# Patient Record
Sex: MALE | Race: WHITE | NOT HISPANIC OR LATINO | Employment: OTHER | URBAN - METROPOLITAN AREA
[De-identification: names, ages, dates, MRNs, and addresses within clinical notes are randomized per-mention and may not be internally consistent; named-entity substitution may affect disease eponyms.]

---

## 2023-10-20 ENCOUNTER — HOSPITAL ENCOUNTER (EMERGENCY)
Facility: HOSPITAL | Age: 38
Discharge: HOME | End: 2023-10-20
Attending: EMERGENCY MEDICINE
Payer: OTHER GOVERNMENT

## 2023-10-20 VITALS
HEART RATE: 98 BPM | OXYGEN SATURATION: 100 % | RESPIRATION RATE: 18 BRPM | TEMPERATURE: 98 F | SYSTOLIC BLOOD PRESSURE: 126 MMHG | WEIGHT: 220 LBS | DIASTOLIC BLOOD PRESSURE: 95 MMHG | HEIGHT: 73 IN | BODY MASS INDEX: 29.16 KG/M2

## 2023-10-20 DIAGNOSIS — M79.652 LEFT THIGH PAIN: Primary | ICD-10-CM

## 2023-10-20 DIAGNOSIS — R03.0 ELEVATED BLOOD PRESSURE READING: ICD-10-CM

## 2023-10-20 PROCEDURE — 99283 EMERGENCY DEPT VISIT LOW MDM: CPT

## 2023-10-20 RX ORDER — METHOCARBAMOL 500 MG/1
1000 TABLET, FILM COATED ORAL 3 TIMES DAILY PRN
Qty: 20 TABLET | Refills: 0 | Status: SHIPPED | OUTPATIENT
Start: 2023-10-20 | End: 2023-10-25

## 2023-10-20 NOTE — DISCHARGE INSTRUCTIONS
Get some good rest.  Ice to the area to help with any stiffness and soreness.  Continue with ibuprofen or Tylenol as needed for pain.  Robaxin for continued stiffness/soreness. Be aware, this medication is sedating.  Do not mix with alcohol or any other sedating medications.  Do not drive or operate machinery when taking this medication.  Avoid movements which worsen your discomfort, especially for the next 2 weeks.    Contact your primary care provider for follow-up and re-evaluation.  Follow-up with a local orthopedic physician for re-evaluation of any persistent or worsening symptoms.    Return to this ED if you develop leg swelling, if unable to walk or bear weight, if unable to tolerate pain, if any other problems occur.

## 2023-10-20 NOTE — ED PROVIDER NOTES
Encounter Date: 10/20/2023       History     Chief Complaint   Patient presents with    Leg Injury     Left leg pain after hearing a pop while playing flag football, ambulatory but with increasing pain     38-year-old male presents to ED with chief complaint left posterior thigh pain after injury earlier today.    Patient was playing flag football; he was running a route, attempted to burst into spread, felt a pop to his left leg posterior thigh region.  Denies any significant pain initially.  States over the past few hours has experience worsening pain, stiffness to the area, associated tenderness.  No trauma to the leg.  There is some discomfort with certain hip range of motion.  There is discomfort with full extension of his knee.  Associated mild antalgic gait secondary to discomfort.  No relief with Motrin.  There is no joint swelling.  No open wound.  Denies any obvious ecchymosis or swelling to the area.  Area feels tight, sharp pain with certain movements.  No previous injury or surgery to the area.  No unilateral leg swelling.  No history of VTE.  No calf pain.  No joint pain.  Denies associated back pain.  No leg weakness.  Denies any radiation of pain.  No radicular complaints.  No anesthesia.    Denies significant past medical history      Review of patient's allergies indicates:   Allergen Reactions    Peanut (legumes)      History reviewed. No pertinent past medical history.  History reviewed. No pertinent surgical history.  History reviewed. No pertinent family history.  Social History     Tobacco Use    Smoking status: Never    Smokeless tobacco: Never   Substance Use Topics    Alcohol use: Yes     Comment: occ    Drug use: Never     Review of Systems   Constitutional:  Negative for fever.   Musculoskeletal:  Positive for gait problem. Negative for arthralgias, back pain and joint swelling.   Skin:  Negative for rash and wound.   Neurological:  Negative for syncope and weakness.       Physical Exam      Initial Vitals [10/20/23 0138]   BP Pulse Resp Temp SpO2   (!) 153/105 99 18 97.8 °F (36.6 °C) 100 %      MAP       --         Physical Exam    Nursing note and vitals reviewed.  Constitutional: He appears well-developed and well-nourished. He is not diaphoretic. No distress.   HENT:   Head: Normocephalic and atraumatic.   Neck: Neck supple.   Normal range of motion.  Cardiovascular:  Intact distal pulses.           2+ DP bilaterally   Musculoskeletal:      Cervical back: Normal range of motion and neck supple.      Comments: No tenderness to the pelvis or greater trochanter of his left hip.  Retains full active range of motion of his hip, knee with some discomfort during full knee extension, hip flexion.  There is pain to the posterior thigh with straight leg testing, no reported back pain.  There is tenderness to the midshaft femur posterior thigh soft tissues.  No overlying skin changes to the area.  No popliteal mass or palpable cord, no popliteal tenderness.  No joint swelling.     Neurological: He is alert and oriented to person, place, and time.   Skin: Skin is warm. Capillary refill takes less than 2 seconds.   Psychiatric: He has a normal mood and affect. Thought content normal.         ED Course   Procedures  Labs Reviewed - No data to display       Imaging Results    None          Medications - No data to display  Medical Decision Making  Differential diagnosis:  Hip strain, Achilles injury, fracture, contusion, arthritis, DVT    Amount and/or Complexity of Data Reviewed  Discussion of management or test interpretation with external provider(s): No unilateral leg swelling or calf tenderness.  No tenderness along the deep vein system of left lower extremity.  Muscular tenderness, pain with extension of knee, with straight leg testing.  Tolerated weight-bearing with mildly antalgic gait.  Good distal pulses.  No convincing evidence of compartment syndrome.  Given history and presentation, suspect  hamstring strain, posterior chain musculature strain.  Advised rest, ice, continue with Tylenol or ibuprofen as needed for pain, will start on muscle relaxers, advised follow-up with his PCM for re-evaluation.  Referral to orthopedics placed for any persistent symptoms.  Patient comfortable with current plan.    Risk  Prescription drug management.                               Clinical Impression:   Final diagnoses:  [M79.652] Left thigh pain (Primary)  [R03.0] Elevated blood pressure reading        ED Disposition Condition    Discharge Stable          ED Prescriptions       Medication Sig Dispense Start Date End Date Auth. Provider    methocarbamoL (ROBAXIN) 500 MG Tab Take 2 tablets (1,000 mg total) by mouth 3 (three) times daily as needed (Stiffness/soreness). 20 tablet 10/20/2023 10/25/2023 Rommel Arenas PA-C          Follow-up Information       Follow up With Specialties Details Why Contact Info    Kenzie Gomez MD Orthopedic Surgery Schedule an appointment as soon as possible for a visit  For reevaluation, If symptoms persist [address] [facility] [id number]  [phone]               Rommel Arenas PA-C  10/20/23 0244

## 2023-10-20 NOTE — ED TRIAGE NOTES
Balta Dumont is a 38 y.o male to ED c/o left leg injury.  States that he was running 5 hrs pta and felt a pop.  Tightness to posterior left thigh 7/10 pain reported. Motrin taken 1hr pta with no relief of symptoms. Was referred to ED by nurses hotline.